# Patient Record
Sex: FEMALE | Race: WHITE
[De-identification: names, ages, dates, MRNs, and addresses within clinical notes are randomized per-mention and may not be internally consistent; named-entity substitution may affect disease eponyms.]

---

## 2018-08-10 ENCOUNTER — HOSPITAL ENCOUNTER (OUTPATIENT)
Dept: HOSPITAL 45 - C.ULTRBC | Age: 28
Discharge: HOME | End: 2018-08-10
Attending: FAMILY MEDICINE
Payer: COMMERCIAL

## 2018-08-10 DIAGNOSIS — E03.9: Primary | ICD-10-CM

## 2018-08-10 NOTE — DIAGNOSTIC IMAGING REPORT
THYROID ULTRASOUND



HISTORY:      HYPOTHYROIDISM



COMPARISON:  None.



FINDINGS:



Right lobe: 4.9 x 1.8 x 1.6 cm. The gland is heterogeneous and hypervascular. No

nodules.



Left lobe: 5.2 x 1.7 x 1.1 cm. The gland is heterogeneous and hypervascular. No

nodules.



Isthmus: 2 mm in thickness. No nodules.



IMPRESSION:  

No thyroid nodules. Heterogeneous and hypervascular thyroid gland. This raises

the possibility of a thyroiditis. 







Electronically signed by:  Kenneth Mendez M.D.

8/10/2018 10:17 AM



Dictated Date/Time:  8/10/2018 10:17 AM